# Patient Record
Sex: FEMALE | URBAN - METROPOLITAN AREA
[De-identification: names, ages, dates, MRNs, and addresses within clinical notes are randomized per-mention and may not be internally consistent; named-entity substitution may affect disease eponyms.]

---

## 2022-03-03 ENCOUNTER — WEB ENCOUNTER (OUTPATIENT)
Dept: URBAN - METROPOLITAN AREA CLINIC 105 | Facility: CLINIC | Age: 37
End: 2022-03-03

## 2022-04-25 ENCOUNTER — OFFICE VISIT (OUTPATIENT)
Dept: URBAN - METROPOLITAN AREA CLINIC 105 | Facility: CLINIC | Age: 37
End: 2022-04-25
Payer: COMMERCIAL

## 2022-04-25 ENCOUNTER — LAB OUTSIDE AN ENCOUNTER (OUTPATIENT)
Dept: URBAN - METROPOLITAN AREA CLINIC 105 | Facility: CLINIC | Age: 37
End: 2022-04-25

## 2022-04-25 ENCOUNTER — DASHBOARD ENCOUNTERS (OUTPATIENT)
Age: 37
End: 2022-04-25

## 2022-04-25 ENCOUNTER — WEB ENCOUNTER (OUTPATIENT)
Dept: URBAN - METROPOLITAN AREA CLINIC 105 | Facility: CLINIC | Age: 37
End: 2022-04-25

## 2022-04-25 VITALS
BODY MASS INDEX: 23.36 KG/M2 | HEIGHT: 60 IN | DIASTOLIC BLOOD PRESSURE: 71 MMHG | SYSTOLIC BLOOD PRESSURE: 105 MMHG | TEMPERATURE: 97.5 F | WEIGHT: 119 LBS | HEART RATE: 88 BPM

## 2022-04-25 DIAGNOSIS — K92.1 BLOOD IN STOOL: ICD-10-CM

## 2022-04-25 DIAGNOSIS — R10.84 ABDOMINAL CRAMPING, GENERALIZED: ICD-10-CM

## 2022-04-25 DIAGNOSIS — K58.0 IRRITABLE BOWEL SYNDROME WITH DIARRHEA: ICD-10-CM

## 2022-04-25 PROBLEM — 197125005: Status: ACTIVE | Noted: 2022-04-25

## 2022-04-25 PROCEDURE — 99204 OFFICE O/P NEW MOD 45 MIN: CPT | Performed by: INTERNAL MEDICINE

## 2022-04-25 RX ORDER — LACTOBACIL 2/BIFIDO 1/S.THERMO 900B CELL
AS DIRECTED PACKET (EA) ORAL
OUTPATIENT
Start: 2022-04-25

## 2022-04-25 RX ORDER — CHOLECALCIFEROL (VITAMIN D3) 50 MCG
1 TABLET TABLET ORAL ONCE A DAY
Status: ACTIVE | COMMUNITY

## 2022-04-25 RX ORDER — SPIRONOLACTONE 50 MG/1
TABLET ORAL
Qty: 90 | Status: ACTIVE | COMMUNITY

## 2022-04-25 RX ORDER — POLYETHYLENE GLYCOL-3350, SODIUM CHLORIDE, POTASSIUM CHLORIDE AND SODIUM BICARBONATE 420; 11.2; 5.72; 1.48 G/438.4G; G/438.4G; G/438.4G; G/438.4G
AS DIRECTED POWDER, FOR SOLUTION ORAL ONCE
Qty: 1 KIT | Refills: 0 | OUTPATIENT
Start: 2022-04-25 | End: 2022-04-26

## 2022-04-25 RX ORDER — HYOSCYAMINE SULFATE 0.12 MG/1
1 TABLET UNDER THE TONGUE AND ALLOW TO DISSOLVE  AS NEEDED TABLET, ORALLY DISINTEGRATING ORAL
Qty: 90 | Refills: 0 | OUTPATIENT
Start: 2022-04-25 | End: 2022-07-24

## 2022-04-25 RX ORDER — NORETHINDRONE ACETATE AND ETHINYL ESTRADIOL, ETHINYL ESTRADIOL AND FERROUS FUMARATE 1MG-10(24)
KIT ORAL
Qty: 84 | Status: ACTIVE | COMMUNITY

## 2022-04-25 RX ORDER — OMEPRAZOLE 40 MG/1
CAPSULE, DELAYED RELEASE ORAL
Qty: 30 | Status: ACTIVE | COMMUNITY

## 2022-04-25 RX ORDER — MONTELUKAST SODIUM 10 MG/1
TABLET, FILM COATED ORAL
Qty: 90 | Status: ACTIVE | COMMUNITY

## 2022-04-25 RX ORDER — L.ACID,FERM,PLA,RHA/B.BIF,LONG 126 MG
AS DIRECTED TABLET, DELAYED AND EXTENDED RELEASE ORAL
Status: ACTIVE | COMMUNITY

## 2022-04-25 NOTE — HPI-TODAY'S VISIT:
4/25/22 35 yo lady pt of DR Shawnee Aceves for IBS.  She had an EGD and colonoscopy 10 years ago - told she had IBS, hemorrhoids and gastritis.  Meds were suggested but she did not follow up. Her PCP 3 years ago prescribed dicyclomine prn. Until another PCP told her to stop "I was sick and they were not sure why". 3 weeks later labs only showed she was "very dehydrated". Her spironolactone was changed.  In 11/2021 had bloody stools for a week straight. She could not get in to see a GI MD.  Her current issues were blood on tissue wipes for a week in 11/2021 She also had some heartburn and chest discomfort. PCP put her on famotidine no help. Then Omeprazole 40 mg prn which helps her.  Her biggest issue she says is feeling flushed before a BM and resolves after a BM.  Says she normally has a BM daily - says she is never really constipated.  She usually will have a BM about 1-2ice a day.  If her stomach is upset she will go 4 or 5 times a day. Is not sure of her triggers. This happens about once a week.  She takes Align daily for the past 10 years  She has not tried a low fodmaps diet in the past.  Usually will have abd cramps if she eats something that upset her before a BM.